# Patient Record
Sex: FEMALE | Race: WHITE | NOT HISPANIC OR LATINO | Employment: OTHER | URBAN - METROPOLITAN AREA
[De-identification: names, ages, dates, MRNs, and addresses within clinical notes are randomized per-mention and may not be internally consistent; named-entity substitution may affect disease eponyms.]

---

## 2017-01-25 ENCOUNTER — GENERIC CONVERSION - ENCOUNTER (OUTPATIENT)
Dept: OTHER | Facility: OTHER | Age: 72
End: 2017-01-25

## 2017-01-28 ENCOUNTER — ALLSCRIPTS OFFICE VISIT (OUTPATIENT)
Dept: OTHER | Facility: OTHER | Age: 72
End: 2017-01-28

## 2017-01-31 ENCOUNTER — GENERIC CONVERSION - ENCOUNTER (OUTPATIENT)
Dept: OTHER | Facility: OTHER | Age: 72
End: 2017-01-31

## 2017-02-02 ENCOUNTER — ALLSCRIPTS OFFICE VISIT (OUTPATIENT)
Dept: OTHER | Facility: OTHER | Age: 72
End: 2017-02-02

## 2017-02-06 ENCOUNTER — HOSPITAL ENCOUNTER (OUTPATIENT)
Dept: RADIOLOGY | Age: 72
Discharge: HOME | End: 2017-02-06
Payer: MEDICARE

## 2017-02-06 DIAGNOSIS — Z12.31 ENCOUNTER FOR SCREENING MAMMOGRAM FOR MALIGNANT NEOPLASM OF BREAST: ICD-10-CM

## 2017-02-06 PROCEDURE — G0202 SCR MAMMO BI INCL CAD: HCPCS

## 2017-02-09 ENCOUNTER — GENERIC CONVERSION - ENCOUNTER (OUTPATIENT)
Dept: OTHER | Facility: OTHER | Age: 72
End: 2017-02-09

## 2017-02-11 ENCOUNTER — GENERIC CONVERSION - ENCOUNTER (OUTPATIENT)
Dept: OTHER | Facility: OTHER | Age: 72
End: 2017-02-11

## 2017-02-12 ENCOUNTER — GENERIC CONVERSION - ENCOUNTER (OUTPATIENT)
Dept: OTHER | Facility: OTHER | Age: 72
End: 2017-02-12

## 2017-02-13 ENCOUNTER — GENERIC CONVERSION - ENCOUNTER (OUTPATIENT)
Dept: OTHER | Facility: OTHER | Age: 72
End: 2017-02-13

## 2017-02-16 ENCOUNTER — GENERIC CONVERSION - ENCOUNTER (OUTPATIENT)
Dept: OTHER | Facility: OTHER | Age: 72
End: 2017-02-16

## 2017-02-22 ENCOUNTER — GENERIC CONVERSION - ENCOUNTER (OUTPATIENT)
Dept: OTHER | Facility: OTHER | Age: 72
End: 2017-02-22

## 2017-02-27 ENCOUNTER — GENERIC CONVERSION - ENCOUNTER (OUTPATIENT)
Dept: OTHER | Facility: OTHER | Age: 72
End: 2017-02-27

## 2017-03-03 ENCOUNTER — GENERIC CONVERSION - ENCOUNTER (OUTPATIENT)
Dept: OTHER | Facility: OTHER | Age: 72
End: 2017-03-03

## 2017-03-20 ENCOUNTER — GENERIC CONVERSION - ENCOUNTER (OUTPATIENT)
Dept: OTHER | Facility: OTHER | Age: 72
End: 2017-03-20

## 2017-03-23 ENCOUNTER — GENERIC CONVERSION - ENCOUNTER (OUTPATIENT)
Dept: OTHER | Facility: OTHER | Age: 72
End: 2017-03-23

## 2017-03-30 ENCOUNTER — GENERIC CONVERSION - ENCOUNTER (OUTPATIENT)
Dept: OTHER | Facility: OTHER | Age: 72
End: 2017-03-30

## 2017-04-11 ENCOUNTER — GENERIC CONVERSION - ENCOUNTER (OUTPATIENT)
Dept: OTHER | Facility: OTHER | Age: 72
End: 2017-04-11

## 2017-04-20 ENCOUNTER — GENERIC CONVERSION - ENCOUNTER (OUTPATIENT)
Dept: OTHER | Facility: OTHER | Age: 72
End: 2017-04-20

## 2017-05-01 ENCOUNTER — ALLSCRIPTS OFFICE VISIT (OUTPATIENT)
Dept: OTHER | Facility: OTHER | Age: 72
End: 2017-05-01

## 2017-05-01 ENCOUNTER — GENERIC CONVERSION - ENCOUNTER (OUTPATIENT)
Dept: OTHER | Facility: OTHER | Age: 72
End: 2017-05-01

## 2017-05-02 ENCOUNTER — GENERIC CONVERSION - ENCOUNTER (OUTPATIENT)
Dept: OTHER | Facility: OTHER | Age: 72
End: 2017-05-02

## 2017-05-10 ENCOUNTER — GENERIC CONVERSION - ENCOUNTER (OUTPATIENT)
Dept: OTHER | Facility: OTHER | Age: 72
End: 2017-05-10

## 2017-05-26 ENCOUNTER — GENERIC CONVERSION - ENCOUNTER (OUTPATIENT)
Dept: OTHER | Facility: OTHER | Age: 72
End: 2017-05-26

## 2017-06-03 ENCOUNTER — GENERIC CONVERSION - ENCOUNTER (OUTPATIENT)
Dept: OTHER | Facility: OTHER | Age: 72
End: 2017-06-03

## 2017-06-08 ENCOUNTER — GENERIC CONVERSION - ENCOUNTER (OUTPATIENT)
Dept: OTHER | Facility: OTHER | Age: 72
End: 2017-06-08

## 2017-07-06 ENCOUNTER — GENERIC CONVERSION - ENCOUNTER (OUTPATIENT)
Dept: OTHER | Facility: OTHER | Age: 72
End: 2017-07-06

## 2017-07-24 ENCOUNTER — GENERIC CONVERSION - ENCOUNTER (OUTPATIENT)
Dept: OTHER | Facility: OTHER | Age: 72
End: 2017-07-24

## 2017-08-04 ENCOUNTER — GENERIC CONVERSION - ENCOUNTER (OUTPATIENT)
Dept: OTHER | Facility: OTHER | Age: 72
End: 2017-08-04

## 2017-08-18 ENCOUNTER — GENERIC CONVERSION - ENCOUNTER (OUTPATIENT)
Dept: OTHER | Facility: OTHER | Age: 72
End: 2017-08-18

## 2017-08-25 ENCOUNTER — ALLSCRIPTS OFFICE VISIT (OUTPATIENT)
Dept: OTHER | Facility: OTHER | Age: 72
End: 2017-08-25

## 2017-08-25 DIAGNOSIS — M54.50 LOW BACK PAIN: ICD-10-CM

## 2017-08-28 ENCOUNTER — GENERIC CONVERSION - ENCOUNTER (OUTPATIENT)
Dept: OTHER | Facility: OTHER | Age: 72
End: 2017-08-28

## 2017-09-07 ENCOUNTER — GENERIC CONVERSION - ENCOUNTER (OUTPATIENT)
Dept: OTHER | Facility: OTHER | Age: 72
End: 2017-09-07

## 2017-09-11 ENCOUNTER — GENERIC CONVERSION - ENCOUNTER (OUTPATIENT)
Dept: OTHER | Facility: OTHER | Age: 72
End: 2017-09-11

## 2017-09-29 ENCOUNTER — GENERIC CONVERSION - ENCOUNTER (OUTPATIENT)
Dept: OTHER | Facility: OTHER | Age: 72
End: 2017-09-29

## 2017-10-16 ENCOUNTER — GENERIC CONVERSION - ENCOUNTER (OUTPATIENT)
Dept: OTHER | Facility: OTHER | Age: 72
End: 2017-10-16

## 2017-10-18 ENCOUNTER — GENERIC CONVERSION - ENCOUNTER (OUTPATIENT)
Dept: OTHER | Facility: OTHER | Age: 72
End: 2017-10-18

## 2017-11-08 ENCOUNTER — GENERIC CONVERSION - ENCOUNTER (OUTPATIENT)
Dept: OTHER | Facility: OTHER | Age: 72
End: 2017-11-08

## 2017-11-28 ENCOUNTER — GENERIC CONVERSION - ENCOUNTER (OUTPATIENT)
Dept: OTHER | Facility: OTHER | Age: 72
End: 2017-11-28

## 2017-12-18 ENCOUNTER — GENERIC CONVERSION - ENCOUNTER (OUTPATIENT)
Dept: INTERNAL MEDICINE CLINIC | Facility: CLINIC | Age: 72
End: 2017-12-18

## 2017-12-29 ENCOUNTER — GENERIC CONVERSION - ENCOUNTER (OUTPATIENT)
Dept: INTERNAL MEDICINE CLINIC | Facility: CLINIC | Age: 72
End: 2017-12-29

## 2018-01-02 ENCOUNTER — GENERIC CONVERSION - ENCOUNTER (OUTPATIENT)
Dept: INTERNAL MEDICINE CLINIC | Facility: CLINIC | Age: 73
End: 2018-01-02

## 2018-01-08 ENCOUNTER — GENERIC CONVERSION - ENCOUNTER (OUTPATIENT)
Dept: INTERNAL MEDICINE CLINIC | Facility: CLINIC | Age: 73
End: 2018-01-08

## 2018-01-11 NOTE — PROGRESS NOTES
Assessment    1  Temporal headache (784 0) (R51)   2  Essential hypertension with goal blood pressure less than 130/80 (401 9) (I10)    Plan  Temporal headache    · (1) C-REACTIVE PROTEIN; Status:Active; Requested for:26Apr2016;    · (1) SED RATE; Status:Active; Requested for:26Apr2016;     Discussion/Summary  The patient was counseled regarding diagnostic results, prognosis, impressions  Chief Complaint    1  Dizziness   2  Headache  Pt states that she has been having severe headaches  She states that when she takes her BP at home when she has the headaches it is elevated  Pt states that she just started taking lisinopril - 1st dose last night 2 5mg 1qd  She is also c/o vertigo for about 1 week now  She states that she had a CT scan in April and an MRI in Feb @ [facility]  History of Present Illness  Dizziness:   Thea Thomas presents with complaints of gradual onset of intermittent episodes of mild dizziness, described as vertigo  Associated symptoms include no weakness, no syncope, no difficulty ambulating, no nausea, no vomiting, no diarrhea, no headache and no fever  Headache:   Thea Thomas presents with complaints of gradual onset of intermittent episodes of moderate unilateral and right temporal headache, described as dull  Associated symptoms include no nausea and no vomiting  Review of Systems    Constitutional: No fever, no chills, feels well, no tiredness, no recent weight gain or loss  ENT: no ear ache, no loss of hearing, no nosebleeds or nasal discharge, no sore throat or hoarseness  Cardiovascular: no complaints of slow or fast heart rate, no chest pain, no palpitations, no leg claudication or lower extremity edema  Respiratory: no complaints of shortness of breath, no wheezing, no dyspnea on exertion, no orthopnea or PND  Breasts: no complaints of breast pain, breast lump or nipple discharge     Gastrointestinal: no complaints of abdominal pain, no constipation, no nausea or diarrhea, no vomiting, no bloody stools  Genitourinary: no complaints of dysuria, no incontinence, no pelvic pain, no dysmenorrhea, no vaginal discharge or abnormal vaginal bleeding  Musculoskeletal: no complaints of arthralgia, no myalgia, no joint swelling or stiffness, no limb pain or swelling  Integumentary: no complaints of skin rash or lesion, no itching or dry skin, no skin wounds  Neurological: as noted in HPI  Active Problems    1  Aortic sclerosis (440 0) (I70 0)   2  Benign essential hypertension (401 1) (I10)   3  Bronchitis (490) (J40)   4  Cough (786 2) (R05)   5  GERD without esophagitis (530 81) (K21 9)   6  Hypercholesterolemia (272 0) (E78 0)   7  Need for Tdap vaccination (V06 1) (Z23)   8  Need for vaccination with 13-polyvalent pneumococcal conjugate vaccine (V03 82) (Z23)   9  Osteopenia (733 90) (M85 80)    Social History    · Alcohol Use (History)   · Denied: History of Drug Use   · Exercising Regularly   · PRIMARY FORM OF EXERCISE IS WALKING  FREQUENCY IS 2 DAYS PER WEEK  · Marital History - Currently    · Never A Smoker   · Retired From Work   · Social alcohol use (Z78 9)    Current Meds   1  Advair Diskus 250-50 MCG/DOSE Inhalation Aerosol Powder Breath Activated; INHALE 1   PUFFS Twice daily; Therapy: (Recorded:08Apr2016) to Recorded   2  Allegra Allergy 180 MG Oral Tablet; TAKE 1 TABLET DAILY; Therapy: (Recorded:83Zwj8654) to Recorded   3  Calcium TABS; Therapy: (Recorded:21Mar2014) to Recorded   4  Co Q 10 CAPS; Therapy: (Recorded:21Mar2014) to Recorded   5  Coumadin 10 MG Oral Tablet; TAKE 1 TABLET DAILY  Requested for: 08Apr2016; Last   Rx:08Apr2016 Ordered   6  Daily Multi Oral Tablet; Therapy: (Recorded:21Mar2014) to Recorded   7  Glutathione TABS; Therapy: [phone]) to Recorded   8  Lisinopril 2 5 MG Oral Tablet; TAKE 1 TABLET Bedtime; Therapy: 83HDW0655 to Recorded   9  Omega 3 CAPS;    Therapy: (Recorded:21Mar2014) to Recorded   10  Vitamin C TABS; Therapy: (Recorded:21Mar2014) to Recorded   11  Vitamin D CAPS; Therapy: (Recorded:21Mar2014) to Recorded   12  Warfarin Sodium 7 5 MG Oral Tablet; TAKE 1 TABLET EVERY OTHER DAY; Therapy: 58NFJ0530 to (Theron Hero)  Requested for: 76ZET3721; Last    Rx:03Nov2014 Ordered   13  Xalatan 0 005 % Ophthalmic Solution; INSTILL 1 DROP TO AFFECTED EYE(S) EACH    EVENING; Therapy: (Recorded:56Jbo7946) to Recorded   14  Xopenex HFA 45 MCG/ACT Inhalation Aerosol; INHALE 1 PUFF EVERY 4 HOURS AS    NEEDED; Last Rx:08Apr2016 Ordered    The medication list was reviewed and updated today  Allergies    1  Crestor TABS   2  Influenza Virus Vaccine Whole   3  Morphine Derivatives   4  Penicillins   5  Percocet TABS   6  Sulfa Drugs    7  Adhesive Tape    Vitals   Recorded: 41MIZ8373 02:49PM   Temperature 97 7 F, Oral   Heart Rate 98   Systolic 106, LUE, Sitting   Diastolic 68, LUE, Sitting   BP Cuff Size Extra-Large   Height 5 ft 8 in   Weight 191 lb 4 oz   BMI Calculated 29 08   BSA Calculated 2 01   O2 Saturation 98, RA     Physical Exam    Constitutional   General appearance: No acute distress, well appearing and well nourished  Eyes   Conjunctiva and lids: No swelling, erythema or discharge  Pupils and irises: Equal, round and reactive to light  Ears, Nose, Mouth, and Throat   External inspection of ears and nose: Normal     Otoscopic examination: Abnormal     Pulmonary   Respiratory effort: No increased work of breathing or signs of respiratory distress  Auscultation of lungs: Clear to auscultation  Cardiovascular   Palpation of heart: Normal PMI, no thrills  Auscultation of heart: Normal rate and rhythm, normal S1 and S2, without murmurs  Abdomen   Abdomen: Non-tender, no masses  Liver and spleen: No hepatomegaly or splenomegaly      Lymphatic   Palpation of lymph nodes in neck: Abnormal   right anterior cervical node enlargement  Musculoskeletal   Gait and station: Normal     Skin   Skin and subcutaneous tissue: Normal without rashes or lesions      Psychiatric   Orientation to person, place, and time: Normal     Mood and affect: Normal     Diabetic Foot Screen: Abnormal        Future Appointments    Date/Time Provider Specialty Site   10/17/2016 10:30 AM Yaritza Ramos MD Internal Medicine [facility]     Signatures   Electronically signed by : Sam Youssef MD; Apr 26 2016  3:33PM EST                       (Author)

## 2018-01-12 VITALS
SYSTOLIC BLOOD PRESSURE: 136 MMHG | TEMPERATURE: 98.8 F | WEIGHT: 188.25 LBS | BODY MASS INDEX: 28.53 KG/M2 | HEIGHT: 68 IN | HEART RATE: 84 BPM | OXYGEN SATURATION: 97 % | DIASTOLIC BLOOD PRESSURE: 88 MMHG

## 2018-01-12 VITALS
SYSTOLIC BLOOD PRESSURE: 126 MMHG | HEART RATE: 79 BPM | OXYGEN SATURATION: 98 % | BODY MASS INDEX: 28.08 KG/M2 | DIASTOLIC BLOOD PRESSURE: 80 MMHG | TEMPERATURE: 98.4 F | WEIGHT: 185.25 LBS | HEIGHT: 68 IN

## 2018-01-13 VITALS
TEMPERATURE: 97.7 F | HEART RATE: 116 BPM | WEIGHT: 185 LBS | HEIGHT: 68 IN | SYSTOLIC BLOOD PRESSURE: 122 MMHG | DIASTOLIC BLOOD PRESSURE: 78 MMHG | BODY MASS INDEX: 28.04 KG/M2 | OXYGEN SATURATION: 98 %

## 2018-01-13 VITALS
SYSTOLIC BLOOD PRESSURE: 138 MMHG | DIASTOLIC BLOOD PRESSURE: 74 MMHG | HEART RATE: 94 BPM | HEIGHT: 68 IN | BODY MASS INDEX: 27.94 KG/M2 | OXYGEN SATURATION: 97 % | WEIGHT: 184.38 LBS | TEMPERATURE: 97.9 F

## 2018-01-15 NOTE — MISCELLANEOUS
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, hyperlipidemia, DVT, subclinical iodine-deficiency hypothyroidism, bruit of left carotid artery, cough, SOB, allergic rhinitis, anticardiolipin syndrome, saccular aneurysm, abnormal CT of brain  She was discharged to home  Medications reviewed and updated today  She scheduled a follow up appointment  Follow-up appointments with other specialists: has yet to make appt with neurology  Symptoms: dizziness and headache  Referrals Needed:  pt stated that she needs a prescription for therapy  Counseling was provided to the patient  Communication performed and completed by warren      Active Problems     1  Aortic sclerosis (440 0) (I70 0)   2  GERD without esophagitis (530 81) (K21 9)   3  Hypercholesterolemia (272 0) (E78 00)   4  Osteopenia (733 90) (M85 80)    Benign essential hypertension (401 1) (I10)       Temporal headache (784 0) (R51)       Essential hypertension with goal blood pressure less than 130/80 (401 9) (I10)          Past Medical History    1  History of Acute serous otitis media of right ear (381 01) (H65 01)   2  History of Acute URI (465 9) (J06 9)   3  Denied: History of Carrier Of STD   4  History of Chronic cough (786 2) (R05)   5  History of Colonoscopy (Fiberoptic) Screening   6  History of Cough (786 2) (R05)   7  History of Difficulty breathing (786 09) (R06 89)   8  History of Encounter for screening mammogram for malignant neoplasm of breast   (V76 12) (Z12 31)   9  History of acute bronchitis (V12 69) (Z87 09)   10   History of acute conjunctivitis (V12 49) (Z86 69)   11  History of acute pharyngitis (V12 69) (Z87 09)   12  History of acute sinusitis (V12 69) (Z87 09)   13  History of acute sinusitis (V12 69) (Z87 09)   14  History of allergic rhinitis (V12 69) (Z87 09)   15  History of backache (V13 59) (Z87 39)   16  History of bacterial pneumonia (V12 61) (Z87 01)   17  History of bronchitis (V12 69) (Z87 09)   18  History of candidiasis of mouth (V12 09) (Z86 19)   19  History of dizziness (V13 89) (Z87 898)   20  History of glaucoma (V12 49) (Z86 69)   21  History of low back pain (V13 59) (Z87 39)   22  History of oral aphthous ulcers (V12 79) (Z87 19)   23  History of shortness of breath (V13 89) (Z87 898)   24  History of syncope (V15 89) (Z87 898)   25  History of urinary frequency (V13 09) (Z87 898)   26  History of Hypercoagulable state (289 81) (D68 59)   27  History of Internal Hemorrhoids (455 0)   28  History of Iron deficiency anemia due to chronic blood loss (280 0) (D50 0)   29  History of Lung Cancer (V10 11)   30  History of Lung mass (786 6) (R91 8)   31  Denied: History of Mental Status Change   32  History of Otitis media, unspecified laterality   33  History of Pain of lower leg, unspecified laterality (729 5) (M79 669)   34  History of Postherpetic neuralgia (053 19) (B02 29)   35  History of Preoperative clearance (V72 84) (Z01 818)   36  History of Routine Gynecological Exam With Cervical Pap Smear (V72 31)   37  History of Thoracic spine fracture (805 2) (S22 009A)   38  History of Type 2 diabetes mellitus (250 00) (E11 9)    Surgical History    1  History of Lung Surgery    Family History  Mother    1  Family history of Bone cancer   2  Family history of Breast cancer  Father    3  Family history of Diabetes   4  Family history of Heart disease  Family History    5   Family history of Family Health Status 2  Children Living    Social History    · Alcohol Use (History)   · Denied: History of Drug Use   · Exercising Regularly   · Marital History - Currently    · Never A Smoker   · Retired From Work   · Social alcohol use (Z78 9)    Current Meds   1  Advair Diskus 250-50 MCG/DOSE Inhalation Aerosol Powder Breath Activated; INHALE 1   PUFFS Twice daily; Therapy: (Recorded:08Apr2016) to Recorded   2  Allegra Allergy 180 MG Oral Tablet; TAKE 1 TABLET DAILY; Therapy: (Recorded:08Tkj4462) to Recorded   3  Azithromycin 250 MG Oral Tablet; TAKE 2 TABLETS ON DAY 1 THEN TAKE 1 TABLET A   DAY FOR 4 DAYS; Last Rx:27May2016 Ordered   4  Calcium TABS; Therapy: (Recorded:21Mar2014) to Recorded   5  Co Q 10 CAPS; Therapy: (Recorded:21Mar2014) to Recorded   6  Coumadin 10 MG Oral Tablet; TAKE 1 TABLET DAILY  Requested for: 08Apr2016; Last   Rx:08Apr2016 Ordered   7  Daily Multi Oral Tablet; Therapy: (Recorded:21Mar2014) to Recorded   8  Glutathione TABS; Therapy: () to Recorded   9  Lisinopril 2 5 MG Oral Tablet; TAKE 1 TABLET Bedtime; Therapy: 41HTK8914 to Recorded   10  Omega 3 CAPS; Therapy: (Recorded:21Mar2014) to Recorded   11  Vitamin C TABS; Therapy: (Recorded:21Mar2014) to Recorded   12  Vitamin D CAPS; Therapy: (Recorded:21Mar2014) to Recorded   13  Warfarin Sodium 7 5 MG Oral Tablet; TAKE 1 TABLET EVERY OTHER DAY; Therapy: 49ZYS4687 to (Alex Bell)  Requested for: 57NQX4986; Last    Rx:03Nov2014 Ordered   14  Xalatan 0 005 % Ophthalmic Solution; INSTILL 1 DROP TO AFFECTED EYE(S) EACH    EVENING; Therapy: (Recorded:35Ntp2419) to Recorded   15  Xopenex HFA 45 MCG/ACT Inhalation Aerosol; INHALE 1 PUFF EVERY 4 HOURS AS    NEEDED; Last Rx:54Zcw1143 Ordered    Allergies     1  Adhesive Tape TAPE    2  Adhesive Tape   3  Tape    Penicillins : Allergy  Sulfa Drugs : Allergy  Crestor TABS : Allergy  Percocet TABS  Morphine Derivatives  Influenza Virus Vaccine Whole     Health Management  Osteopenia   * Dexa Scan Axial Skel (Hip, Pelvis, Spine); every 2 years; Last 94WEW7638;  Next Due: 19HHA2178;  Near Due  Health Maintenance   *VB - Fall Risk Assessment  (Dx Z13 89 Screen for Neurologic Disorder); every 1 year; Last  08Apr2016; Next Due: 08Apr2017; Active  *VB-Depression Screening; every 1 year; Last 75EMQ5240; Next Due: 97HCN8037; Active  Medicare Annual Wellness Visit; every 1 year; Next Due: 08Apr2016; Overdue    Future Appointments    Date/Time Provider Specialty Site   02/17/2017 10:45 AM Kenny Billy MD Internal Medicine [facility]     Signatures   Electronically signed by : Yary Mansfield, ; Sep 27 2016  2:58PM EST                       (Co-author)    Electronically signed by :  Guru Robb MD; Nov 26 2016  8:54PM EST                       (Author)

## 2018-01-18 NOTE — MISCELLANEOUS
History of Present Illness  TCM Communication [facility]: The patient is being contacted for follow-up after hospitalization and Unable to contact pt to schedule SERAFIN  She was hospitalized at [facility]  The date of admission: 2/27/17, date of discharge: 2/28/17  Diagnosis: hemoptysis, malignant neoplasm of left lung lower lobe, HLD, HTN, GERD  She was discharged to home  Medications were not reviewed today  She did not schedule a follow up appointment  She refused a follow up appointment due to Pt didn't return phonecalls  Communication performed and completed by warren      Active Problems    1  Acute bronchitis, unspecified organism (466 0) (J20 9)   2  Adenocarcinoma of left lung (162 9) (C34 92)   3  Aortic sclerosis (440 0) (I70 0)   4  Cerebral aneurysm (437 3) (I67 1)   5  Chest pain (786 50) (R07 9)   6  Elevated troponin (790 6) (R74 8)   7  Essential hypertension with goal blood pressure less than 130/80 (401 9) (I10)   8  GERD without esophagitis (530 81) (K21 9)   9  Hemoptysis (786 30) (R04 2)   10  Hypercholesterolemia (272 0) (E78 00)   11  Malignant neoplasm of lower lobe of left lung (162 5) (C34 32)   12  Mitral regurgitation (424 0) (I34 0)   13  NSTEMI, initial episode of care (410 71) (I21 4)   14  Osteopenia (733 90) (M85 80)   15  Preoperative clearance (V72 84) (Z01 818)   16  Sore throat (462) (J02 9)    Past Medical History    1  History of Acute serous otitis media of right ear (381 01) (H65 01)   2  History of Acute URI (465 9) (J06 9)   3  History of Benign essential hypertension (401 1) (I10)   4  Denied: History of Carrier Of STD   5  History of Chronic cough (786 2) (R05)   6  History of Colonoscopy (Fiberoptic) Screening   7  History of Cough (786 2) (R05)   8  History of Difficulty breathing (786 09) (R06 89)   9  History of Encounter for screening mammogram for malignant neoplasm of breast   (V76 12) (Z12 31)   10  History of acute bronchitis (V12 69) (Z87 09)   11   History of acute conjunctivitis (V12 49) (Z86 69)   12  History of acute pharyngitis (V12 69) (Z87 09)   13  History of acute sinusitis (V12 69) (Z87 09)   14  History of acute sinusitis (V12 69) (Z87 09)   15  History of allergic rhinitis (V12 69) (Z87 09)   16  History of backache (V13 59) (Z87 39)   17  History of bacterial pneumonia (V12 61) (Z87 01)   18  History of bronchitis (V12 69) (Z87 09)   19  History of candidiasis of mouth (V12 09) (Z86 19)   20  History of dizziness (V13 89) (Z87 898)   21  History of glaucoma (V12 49) (Z86 69)   22  History of low back pain (V13 59) (Z87 39)   23  History of oral aphthous ulcers (V12 79) (Z87 19)   24  History of shortness of breath (V13 89) (Z87 898)   25  History of syncope (V15 89) (Z87 898)   26  History of urinary frequency (V13 09) (Z87 898)   27  History of Hypercoagulable state (289 81) (D68 59)   28  History of Internal Hemorrhoids (455 0)   29  History of Iron deficiency anemia due to chronic blood loss (280 0) (D50 0)   30  History of Lung Cancer (V10 11)   31  History of Lung mass (786 6) (R91 8)   32  Denied: History of Mental Status Change   33  History of Otitis media, unspecified laterality   34  History of Pain of lower leg, unspecified laterality (729 5) (M79 669)   35  History of Postherpetic neuralgia (053 19) (B02 29)   36  History of Preoperative clearance (V72 84) (Z01 818)   37  History of Routine Gynecological Exam With Cervical Pap Smear (V72 31)   38  History of Temporal headache (784 0) (R51)   39  History of Thoracic spine fracture (805 2) (S22 009A)   40  History of Type 2 diabetes mellitus (250 00) (E11 9)    Surgical History    1  History of Lung Surgery    Family History  Mother    1  Family history of Bone cancer   2  Family history of Breast cancer  Father    3  Family history of Diabetes   4  Family history of Heart disease  Family History    5   Family history of Family Health Status 2  Children Living    Social History    · Alcohol Use (History) · Denied: History of Drug Use   · Exercising Regularly   · Marital History - Currently    · Never A Smoker   · Retired From Work   · Social alcohol use (Z78 9)    Current Meds   1  Acetaminophen 500 MG Oral Tablet; TAKE 1 TABLET Every 6 hours PRN; Therapy: (Recorded:12Uba3141) to Recorded   2  Advair Diskus 250-50 MCG/DOSE Inhalation Aerosol Powder Breath Activated; INHALE 1   PUFFS Twice daily  Requested for: 07Ufw1687; Last Rx:07Yiv7866; Status: ACTIVE -   Transmit to Pharmacy - Awaiting Verification Ordered   3  Allegra Allergy 180 MG Oral Tablet (Fexofenadine HCl); TAKE 1 TABLET DAILY; Therapy: (Recorded:53Qch9300) to Recorded   4  Calcium TABS; Take 1 tablet twice daily; Therapy: (Recorded:91Vqu2554) to Recorded   5  Claritin 10 MG Oral Capsule; Therapy: (Recorded:41Duf4935) to Recorded   6  Co Q 10 CAPS; take 1 capsule daily; Therapy: (Recorded:51Dpp4725) to Recorded   7  Daily Multi Oral Tablet; Therapy: (Recorded:21Mar2014) to Recorded   8  Ferrous Sulfate 325 (65 Fe) MG Oral Tablet; TAKE 1 TABLET TWICE DAILY; Therapy: (Recorded:58Qfv8620) to Recorded   9  Flonase 50 MCG/ACT SUSP (Fluticasone Propionate); Therapy: (Recorded:83Aoo3055) to Recorded   10  Folic Acid 1 MG Oral Tablet; TAKE 1 TABLET DAILY AS DIRECTED; Therapy: (Recorded:01Mar2017) to Recorded   11  Glutathione TABS; Take 1 tablet twice daily; Therapy: (Recorded:01Kzn8235) to Recorded   12  Metoprolol Succinate ER 25 MG Oral Tablet Extended Release 24 Hour; TAKE 1 TABLET    DAILY; Therapy: 83TNR2442 to (Evaluate:74Fvi2167)  Requested for: 81OKR6400; Last    Rx:27Hmn3524 Ordered   13  Metoprolol Succinate ER 25 MG Oral Tablet Extended Release 24 Hour; TAKE 1 TABLET    ONCE DAILY; Therapy: (Recorded:27Qau2148) to Recorded   14  Omega 3 CAPS; TAKE 1 CAPSULE 3 times daily; Therapy: (Recorded:29Vyp5038) to Recorded   15  Protonix 40 MG Oral Packet; pt takes one tab daily;     Therapy: 82QDS4961 to (Last Rx: 42RLR8330)  Requested for: 01IAP3181 Ordered   16  RA Vitamin C/Victoria Hips 500 MG Oral Tablet; Therapy: (Recorded:20Jjx1146) to Recorded   17  Vitamin C TABS; Therapy: (Recorded:21Mar2014) to Recorded   18  Vitamin D CAPS; Therapy: (Recorded:21Mar2014) to Recorded   19  Xalatan 0 005 % Ophthalmic Solution (Latanoprost); INSTILL 1 DROP TO AFFECTED    EYE(S) EACH EVENING; Therapy: (Recorded:68Zos3515) to Recorded   20  Xopenex HFA 45 MCG/ACT Inhalation Aerosol; INHALE 1 PUFF EVERY 4 HOURS AS    NEEDED; Last Rx:27Btq3842 Ordered    Allergies    1  Crestor TABS   2  Influenza Virus Vaccine Whole   3  Morphine Derivatives   4  Penicillins   5  Percocet TABS   6  Sulfa Drugs   7  Adhesive Tape TAPE    8  Adhesive Tape   9  Tape    Health Management  Encounter for special screening examination for genitourinary disorder   *VB - Urinary Incontinence Screen (Dx Z13 89 Screen for UI); every 1 year; Last 43IGG1935; Next  Due: 70DXY6231; Active  Osteopenia   * Dexa Scan Axial Skel (Hip, Pelvis, Spine); every 2 years; Last 75FDV2357; Next Due: [id number]; Overdue  Health Maintenance   *VB - Fall Risk Assessment  (Dx Z13 89 Screen for Neurologic Disorder); every 1 year; Last  19Mlx4786; Next Due: 66Fgf9316; Near Due  *VB-Depression Screening; every 1 year; Last 22LOY3409; Next Due: 81LIG9592; Active  Medicare Annual Wellness Visit; every 1 year; Next Due: 12Xok8066;  Overdue    Future Appointments    Date/Time Provider Specialty Site   05/01/2017 10:45 AM Zia Pitt MD Internal Medicine [facility]     Signatures   Electronically signed by : Francisca Leyva, ; Mar  3 2017  9:59AM EST                       (Co-participant)    Electronically signed by : Shawn Burkitt, MD; Mar  8 2017  1:17PM EST

## 2018-01-22 VITALS
BODY MASS INDEX: 28.25 KG/M2 | DIASTOLIC BLOOD PRESSURE: 82 MMHG | SYSTOLIC BLOOD PRESSURE: 126 MMHG | OXYGEN SATURATION: 97 % | HEIGHT: 68 IN | WEIGHT: 186.38 LBS | TEMPERATURE: 98.2 F | HEART RATE: 86 BPM

## 2018-02-07 ENCOUNTER — TRANSCRIBE ORDERS (OUTPATIENT)
Dept: RADIOLOGY | Facility: CLINIC | Age: 73
End: 2018-02-07

## 2018-02-12 ENCOUNTER — HOSPITAL ENCOUNTER (OUTPATIENT)
Dept: RADIOLOGY | Age: 73
Discharge: HOME | End: 2018-02-12
Payer: MEDICARE

## 2018-02-12 DIAGNOSIS — Z12.31 ENCOUNTER FOR SCREENING MAMMOGRAM FOR MALIGNANT NEOPLASM OF BREAST: ICD-10-CM

## 2018-02-12 PROCEDURE — 77067 SCR MAMMO BI INCL CAD: CPT

## 2018-08-08 ENCOUNTER — TELEPHONE (OUTPATIENT)
Dept: INTERNAL MEDICINE CLINIC | Facility: CLINIC | Age: 73
End: 2018-08-08

## 2018-08-08 ENCOUNTER — TRANSITIONAL CARE MANAGEMENT (OUTPATIENT)
Dept: INTERNAL MEDICINE CLINIC | Facility: CLINIC | Age: 73
End: 2018-08-08

## 2018-08-08 RX ORDER — ONDANSETRON HYDROCHLORIDE 8 MG/1
8 TABLET, FILM COATED ORAL EVERY 8 HOURS
COMMUNITY
Start: 2018-04-03

## 2018-08-08 RX ORDER — PREDNISONE 2.5 MG
TABLET ORAL
COMMUNITY
Start: 2018-08-07

## 2018-08-08 RX ORDER — FERROUS SULFATE 325(65) MG
1 TABLET ORAL 2 TIMES DAILY
COMMUNITY

## 2018-08-08 RX ORDER — BISOPROLOL FUMARATE 5 MG/1
5 TABLET ORAL
COMMUNITY
Start: 2018-01-31 | End: 2019-01-31

## 2018-08-08 RX ORDER — LEVOTHYROXINE SODIUM 0.15 MG/1
150 TABLET ORAL
COMMUNITY
Start: 2018-07-09 | End: 2019-07-09

## 2018-08-08 RX ORDER — LATANOPROST 50 UG/ML
SOLUTION/ DROPS OPHTHALMIC
COMMUNITY

## 2018-08-08 RX ORDER — OLANZAPINE 2.5 MG/1
2.5 TABLET ORAL 2 TIMES DAILY
COMMUNITY
Start: 2018-08-07

## 2018-08-08 RX ORDER — MIRTAZAPINE 7.5 MG/1
7.5 TABLET, FILM COATED ORAL
COMMUNITY
Start: 2018-08-07

## 2018-08-08 RX ORDER — PANTOPRAZOLE SODIUM 40 MG/1
40 TABLET, DELAYED RELEASE ORAL
COMMUNITY
Start: 2018-03-28

## 2018-08-08 RX ORDER — FENTANYL 25 UG/H
1 PATCH TRANSDERMAL
COMMUNITY
Start: 2018-07-12 | End: 2018-08-11

## 2018-08-08 RX ORDER — LORATADINE 10 MG/1
CAPSULE, LIQUID FILLED ORAL
COMMUNITY

## 2018-08-08 RX ORDER — FLUTICASONE PROPIONATE 50 MCG
1 SPRAY, SUSPENSION (ML) NASAL
COMMUNITY

## 2018-08-08 RX ORDER — DRONABINOL 5 MG/1
5 CAPSULE ORAL
COMMUNITY
Start: 2018-08-07 | End: 2018-09-06

## 2018-08-08 RX ORDER — PROCHLORPERAZINE MALEATE 5 MG/1
5 TABLET ORAL EVERY 6 HOURS
COMMUNITY
Start: 2018-02-22

## 2018-08-08 RX ORDER — FEXOFENADINE HCL 180 MG/1
180 TABLET ORAL
COMMUNITY

## 2018-08-08 RX ORDER — ACETAMINOPHEN 500 MG
500 TABLET ORAL EVERY 6 HOURS
COMMUNITY
Start: 2017-02-14

## 2018-08-08 RX ORDER — ALPRAZOLAM 0.25 MG/1
0.25 TABLET ORAL 3 TIMES DAILY
COMMUNITY
Start: 2018-07-18

## 2018-08-08 NOTE — TELEPHONE ENCOUNTER
Patient was admitted to [address] [place] [address]  on 8/2/18 for Failure to thrive, shortness of breath and severe anxiety  She has a malignant neoplasm of the LLL lung  She had a CT guided  abdominal lymph node biopsy  She was started on Zyprexa for the anxiety  She was discharged on 8/7/18  She is currently undergoing chemotherapy and following up with heme/onc, cardiology and will have [address] [facility] services through [facility]  She does not want to come in for a follow up appt  I have given her my direct phone number to call if she needs a question answered, assistance with anything  She agreed to call for any problems or needs that she needs assistance with  No TCM is not scheduled at this time

## 2018-08-13 NOTE — TELEPHONE ENCOUNTER
I talked to the patient and her  that followed up by the [facility] and she is going on hospice I do not see any reason to see her in the office right now